# Patient Record
Sex: FEMALE | Race: WHITE | NOT HISPANIC OR LATINO | URBAN - METROPOLITAN AREA
[De-identification: names, ages, dates, MRNs, and addresses within clinical notes are randomized per-mention and may not be internally consistent; named-entity substitution may affect disease eponyms.]

---

## 2017-09-27 ENCOUNTER — OUTPATIENT (OUTPATIENT)
Dept: OUTPATIENT SERVICES | Facility: HOSPITAL | Age: 62
LOS: 1 days | End: 2017-09-27
Payer: COMMERCIAL

## 2017-09-27 ENCOUNTER — APPOINTMENT (OUTPATIENT)
Dept: CT IMAGING | Facility: CLINIC | Age: 62
End: 2017-09-27
Payer: COMMERCIAL

## 2017-09-27 DIAGNOSIS — Z00.8 ENCOUNTER FOR OTHER GENERAL EXAMINATION: ICD-10-CM

## 2017-09-27 PROCEDURE — 70480 CT ORBIT/EAR/FOSSA W/O DYE: CPT

## 2017-09-27 PROCEDURE — 70486 CT MAXILLOFACIAL W/O DYE: CPT | Mod: 26

## 2017-09-27 PROCEDURE — 70480 CT ORBIT/EAR/FOSSA W/O DYE: CPT | Mod: 26

## 2017-09-27 PROCEDURE — 70486 CT MAXILLOFACIAL W/O DYE: CPT

## 2018-11-20 ENCOUNTER — APPOINTMENT (OUTPATIENT)
Dept: ORTHOPEDIC SURGERY | Facility: CLINIC | Age: 63
End: 2018-11-20
Payer: COMMERCIAL

## 2018-11-20 VITALS
HEART RATE: 71 BPM | DIASTOLIC BLOOD PRESSURE: 102 MMHG | HEIGHT: 60 IN | SYSTOLIC BLOOD PRESSURE: 186 MMHG | WEIGHT: 150 LBS | BODY MASS INDEX: 29.45 KG/M2

## 2018-11-20 DIAGNOSIS — Z86.79 PERSONAL HISTORY OF OTHER DISEASES OF THE CIRCULATORY SYSTEM: ICD-10-CM

## 2018-11-20 DIAGNOSIS — F41.1 GENERALIZED ANXIETY DISORDER: ICD-10-CM

## 2018-11-20 DIAGNOSIS — Z87.09 PERSONAL HISTORY OF OTHER DISEASES OF THE RESPIRATORY SYSTEM: ICD-10-CM

## 2018-11-20 DIAGNOSIS — Z87.898 PERSONAL HISTORY OF OTHER SPECIFIED CONDITIONS: ICD-10-CM

## 2018-11-20 PROCEDURE — 99204 OFFICE O/P NEW MOD 45 MIN: CPT

## 2018-11-20 PROCEDURE — 72100 X-RAY EXAM L-S SPINE 2/3 VWS: CPT

## 2018-12-11 ENCOUNTER — APPOINTMENT (OUTPATIENT)
Dept: ORTHOPEDIC SURGERY | Facility: CLINIC | Age: 63
End: 2018-12-11
Payer: COMMERCIAL

## 2018-12-11 VITALS — SYSTOLIC BLOOD PRESSURE: 175 MMHG | DIASTOLIC BLOOD PRESSURE: 102 MMHG | HEART RATE: 71 BPM

## 2018-12-11 PROCEDURE — 99214 OFFICE O/P EST MOD 30 MIN: CPT

## 2018-12-19 ENCOUNTER — RESULT REVIEW (OUTPATIENT)
Age: 63
End: 2018-12-19

## 2019-01-28 ENCOUNTER — APPOINTMENT (OUTPATIENT)
Dept: ORTHOPEDIC SURGERY | Facility: CLINIC | Age: 64
End: 2019-01-28
Payer: COMMERCIAL

## 2019-01-28 VITALS — WEIGHT: 149 LBS | BODY MASS INDEX: 29.25 KG/M2 | HEIGHT: 60 IN

## 2019-01-28 PROCEDURE — 99213 OFFICE O/P EST LOW 20 MIN: CPT

## 2019-01-28 NOTE — HISTORY OF PRESENT ILLNESS
[de-identified] : Her injury occurred in late November the symptoms did not become significantly worsened to late September. Her MRI reveals a bandlike area of edema along the superior endplate of L5 consistent with a compression fracture. Her symptoms have improved on every visit. Currently she has some discomfort, occasional aching and sometimes mild pain. Her bone density reveals a significant decrease. She has been given the name of an osteoporosis specialist. With ongoing symptoms she is not ready to resume active exercise at this point.

## 2019-01-28 NOTE — DISCUSSION/SUMMARY
[de-identified] : She has made arrangements to see osteoporosis vessels. I will see her for followup in 4 weeks.

## 2019-01-28 NOTE — REASON FOR VISIT
[Follow-Up Visit] : a follow-up visit for [Back Pain] : back pain [FreeTextEntry2] : Back pain status post compression fracture of L5

## 2019-01-31 ENCOUNTER — APPOINTMENT (OUTPATIENT)
Dept: ENDOCRINOLOGY | Facility: CLINIC | Age: 64
End: 2019-01-31
Payer: COMMERCIAL

## 2019-01-31 VITALS
HEART RATE: 74 BPM | WEIGHT: 150 LBS | HEIGHT: 60 IN | OXYGEN SATURATION: 98 % | DIASTOLIC BLOOD PRESSURE: 90 MMHG | SYSTOLIC BLOOD PRESSURE: 150 MMHG | BODY MASS INDEX: 29.45 KG/M2

## 2019-01-31 PROCEDURE — 99244 OFF/OP CNSLTJ NEW/EST MOD 40: CPT

## 2019-01-31 RX ORDER — ERGOCALCIFEROL 1.25 MG/1
1.25 MG CAPSULE ORAL
Refills: 0 | Status: ACTIVE | COMMUNITY

## 2019-01-31 RX ORDER — RISEDRONATE SODIUM 150 MG/1
150 TABLET, FILM COATED ORAL
Qty: 3 | Refills: 3 | Status: ACTIVE | COMMUNITY
Start: 2019-01-31 | End: 1900-01-01

## 2019-01-31 RX ORDER — MELOXICAM 7.5 MG/1
7.5 TABLET ORAL
Refills: 0 | Status: COMPLETED | COMMUNITY

## 2019-01-31 RX ORDER — B-12 KIT 1000MCG/ML
KIT INTRAMUSCULAR; SUBCUTANEOUS; TOPICAL
Refills: 0 | Status: ACTIVE | COMMUNITY

## 2019-01-31 RX ORDER — MELOXICAM 15 MG/1
15 TABLET ORAL
Qty: 30 | Refills: 0 | Status: ACTIVE | COMMUNITY
Start: 2019-01-30

## 2019-01-31 RX ORDER — MONTELUKAST 10 MG/1
10 TABLET, FILM COATED ORAL
Refills: 0 | Status: ACTIVE | COMMUNITY

## 2019-01-31 NOTE — CONSULT LETTER
[Consult Letter:] : I had the pleasure of evaluating your patient, [unfilled]. [Please see my note below.] : Please see my note below. [Consult Closing:] : Thank you very much for allowing me to participate in the care of this patient.  If you have any questions, please do not hesitate to contact me. [Sincerely,] : Sincerely, [Dear  ___] : Dear  [unfilled], [DrDevin  ___] : Dr. NARANJO [FreeTextEntry2] : Cale Veloz MD

## 2019-01-31 NOTE — PAST MEDICAL HISTORY
[Menopause Age____] : age at menopause was [unfilled] [History of Hormone Replacement Treatment] : has a history of hormone replacement treatment

## 2019-01-31 NOTE — REVIEW OF SYSTEMS
[Negative] : Heme/Lymph [Dysphonia] : dysphonia [Shortness Of Breath] : shortness of breath [Back Pain] : back pain [FreeTextEntry5] : HTN

## 2019-01-31 NOTE — HISTORY OF PRESENT ILLNESS
[Estrogens (HRT)] : HRT [Calcium (dietary)] : calcium from their regular diet [Vitamin D (supplements)] : Vitamin D as a dietary supplement [Patient taking Meds Correctly] : Patient is taking meds correctly [Taking Steroids] : a history of taking steroids [Current Smoking___(ppd)] : currently smoking [unfilled] ~Upacks per day [Family History of Osteoporosis] : family history of osteoporosis [Regular Dental Follow-Up] : regular dental follow-up [Previous Fragility Fracture] : previous fragility fracture(s) [FreeTextEntry1] : Ms. MELO is a 63 year old female being referred today for osteoporosis. \par \par Pt had compression fx at L5, no specific trauma in Nov 2018. X-rays neg. MRI showed superior endplate fx. Pt had a few chiropractic readjustments. She had BMD Jan 2019 spine:-1.8 fem neck:-2.4 tot hip:-1.2. Images not provided, can not comment on the quality. In 2015 pt had reported osteoporosis in the fem neck:-2.5 though she was never treated. She had a distant ankle and metacarpal fx. FHx of osteoporosis (mother with early menopause and kyphosis). Pt takes Vit D 50,000 1 x/month and B12 shots.  Ca in routine labs normal. \par \par Pt was on HRT for 5 years after menopause then taken off for vasculitis due to an interaction of rx given for H.pylori. Pt was on Prednisone for 6 mons at this time ~2014. She is on Premarin vaginal creme ~2016.\par \par Up to date with DDS, gyn, mammos, and colonoscopy.  [Low Calcium Intake] : no past or present history of low calcium intake [Low Vit D Intake/Exposure] : no past or present history of low vitamin D intake [Kidney Stones] : no history of kidney stones [High Fall Risk] : no fall risk [Frequent Falls] : no frequent falls [Uses a Walker/Cane] : no use of a walker/cane [Family History of Hip Fracture] : no family history of hip fracture [Hyperparathyroidism] : no hyperparathyroidism [History of Radiation Therapy] : no history of radiation therapy [History of Blood Clots] : no history of blood clots

## 2019-01-31 NOTE — PHYSICAL EXAM
[Alert] : alert [No Acute Distress] : no acute distress [Well Nourished] : well nourished [Well Developed] : well developed [Normal Sclera/Conjunctiva] : normal sclera/conjunctiva [EOMI] : extra ocular movement intact [No Proptosis] : no proptosis [Normal Oropharynx] : the oropharynx was normal [Thyroid Not Enlarged] : the thyroid was not enlarged [No Thyroid Nodules] : there were no palpable thyroid nodules [No Respiratory Distress] : no respiratory distress [No Accessory Muscle Use] : no accessory muscle use [Clear to Auscultation] : lungs were clear to auscultation bilaterally [Normal Rate] : heart rate was normal  [Normal S1, S2] : normal S1 and S2 [Regular Rhythm] : with a regular rhythm [Normal Bowel Sounds] : normal bowel sounds [Not Tender] : non-tender [Soft] : abdomen soft [Not Distended] : not distended [Post Cervical Nodes] : posterior cervical nodes [Anterior Cervical Nodes] : anterior cervical nodes [Normal] : normal and non tender [No Spinal Tenderness] : no spinal tenderness [Spine Straight] : spine straight [No Stigmata of Cushings Syndrome] : no stigmata of cushings syndrome [Normal Gait] : normal gait [Normal Strength/Tone] : muscle strength and tone were normal [No Rash] : no rash [Normal Reflexes] : deep tendon reflexes were 2+ and symmetric [No Tremors] : no tremors [Oriented x3] : oriented to person, place, and time [Acanthosis Nigricans] : no acanthosis nigricans

## 2019-01-31 NOTE — ASSESSMENT
[Bisphosphonate Therapy] : Risks  and benefits of bisphosphonate therapy were  discussed with the patient including gastroesophageal irritation, osteonecrosis of the jaw, and atypical femur fractures, and acute phase reaction [Denosumab Therapy] : Risks  and benefits of denosumab therapy were discussed with the patient including eczema, cellulitis, osteonecrosis of the jaw and atypical femur fractures [Bisphosphonates] : The patient was instructed to take bisphosphonates on an empty stomach with a full glass of water,and wait at least 30 minutes before eating or lying down [FreeTextEntry1] : 63 year old female with osteoporosis. \par \par Pt has had low BMD in fem neck since 2015, though she was never previously treated. BMD 2018 essentially stable, though no images provided to review. She had distant ankle and metacarpal fx. Pt was treated with HRT at the time of menopause for 5 years and stopped at the time of complications regarding other rx interactions leading to vasculitis. More recently she had L5 superior end plate fx in Nov 2018. She still has occ back pain. FHx of osteoporosis (mother). She currently smokes and can not get exercise as she used to due to the current back pain. Pt takes Vit D 50,000 monthly. \par \par Recommended no more than calcium 500 mg per day, vitamin D. 2000 units per day, in addition to dietary intake. If pt had persistent back pain due to current fx she could consider Forteo/Tymlos, though this is not really her concern. Recommended initiating medical therapy to stop bone loss, increase BMD, and thus reduce risk of future fx. Standard bone loss prevention medications were reviewed (bisphosphonates). She would benefit from bisphosphonate therapy with the expectation of a drug holiday within 5 years. Risks and benefits of bisphosphonate therapy were discussed to include decreased serum Ca, GI irritation, ONJ, atypical femur fx, and APR. Pt can consider IV Reclast or Prolia if she experiences UGI sx. All questions were answered. Pt understands and agrees to Actonel. Prescription sent out. \par \par f/u in 6 months with repeat BMD in 1 year.  [FreeTextEntry2] : discussed smoking cessation- planning to see  for this in [place].

## 2019-01-31 NOTE — END OF VISIT
[FreeTextEntry3] : I, Na Resi, authored this note working as a medical scribe for Dr. Strange.  01/31/2019. 11:30AM. \par This note was authored by Na Reis working as medical scribe for me. I have reviewed, edited, and revised the note as needed. I am in agreement with the exam findings, imaging findings, and treatment plan.  David Strange MD

## 2019-03-01 ENCOUNTER — APPOINTMENT (OUTPATIENT)
Dept: ORTHOPEDIC SURGERY | Facility: CLINIC | Age: 64
End: 2019-03-01
Payer: COMMERCIAL

## 2019-03-01 DIAGNOSIS — M54.5 LOW BACK PAIN: ICD-10-CM

## 2019-03-01 DIAGNOSIS — M80.00XD AGE-RELATED OSTEOPOROSIS WITH CURRENT PATHOLOGICAL FRACTURE, UNSPECIFIED SITE, SUBSEQUENT ENCOUNTER FOR FRACTURE WITH ROUTINE HEALING: ICD-10-CM

## 2019-03-01 PROCEDURE — 72100 X-RAY EXAM L-S SPINE 2/3 VWS: CPT

## 2019-03-01 PROCEDURE — 99213 OFFICE O/P EST LOW 20 MIN: CPT

## 2019-03-01 NOTE — PHYSICAL EXAM
[de-identified] : AP and lateral x-rays of the lumbar spine show no evidence of any deformity as a result of the compression fracture of L5. The MRI revealed a day and is edema along the superior endplate.

## 2019-03-01 NOTE — HISTORY OF PRESENT ILLNESS
[de-identified] : She is now 4-5 months after a very mild superior end plate compression fracture of L5. She has any osteoporosis specialist and has started on treatment. Her symptoms for the most part currently are only a discomfort.

## 2019-03-01 NOTE — DISCUSSION/SUMMARY
[de-identified] : She consented to gradually increase activities. At this point I would do no more than walking for exercise. I will see her for followup in 6 weeks if she is not making satisfactory progress.

## 2019-05-16 ENCOUNTER — APPOINTMENT (OUTPATIENT)
Dept: ENDOCRINOLOGY | Facility: CLINIC | Age: 64
End: 2019-05-16
Payer: COMMERCIAL

## 2019-05-16 VITALS
HEART RATE: 80 BPM | DIASTOLIC BLOOD PRESSURE: 80 MMHG | SYSTOLIC BLOOD PRESSURE: 122 MMHG | OXYGEN SATURATION: 98 % | WEIGHT: 156 LBS | BODY MASS INDEX: 30.63 KG/M2 | HEIGHT: 60 IN

## 2019-05-16 DIAGNOSIS — M80.80XA OTHER OSTEOPOROSIS WITH CURRENT PATHOLOGICAL FRACTURE, UNSPECIFIED SITE, INITIAL ENCOUNTER FOR FRACTURE: ICD-10-CM

## 2019-05-16 PROCEDURE — 99214 OFFICE O/P EST MOD 30 MIN: CPT

## 2019-05-16 RX ORDER — DIAZEPAM 2 MG/1
2 TABLET ORAL
Refills: 0 | Status: DISCONTINUED | COMMUNITY
End: 2019-05-16

## 2019-05-16 NOTE — HISTORY OF PRESENT ILLNESS
[Estrogens (HRT)] : HRT [Calcium (dietary)] : calcium from their regular diet [Vitamin D (supplements)] : Vitamin D as a dietary supplement [Risedronate (Actonel)] : Risedronate [Patient taking Meds Correctly] : Patient is taking meds correctly [Taking Steroids] : a history of taking steroids [Current Smoking___(ppd)] : currently smoking [unfilled] ~Upacks per day [Family History of Osteoporosis] : family history of osteoporosis [Regular Dental Follow-Up] : regular dental follow-up [Previous Fragility Fracture] : previous fragility fracture(s) [Low Calcium Intake] : no past or present history of low calcium intake [FreeTextEntry1] : 63 year old female with osteoporosis. \par \par Pt had L5 compression fx with no specific trauma in Nov 2018. X-rays neg for fx but MRI showed superior endplate fx. Pt had a few chiropractic readjustments. She had BMD Jan 2019 spine:-1.8 fem neck:-2.4 tot hip:-1.2. Images not provided, can not comment on the quality. In 2015 pt had reported osteoporosis in the fem neck:-2.5 though she was never treated. She had a distant ankle and metacarpal fx. Pt was on HRT for 5 years after menopause then taken off for vasculitis due to an interaction of rx given for H.pylori. Pt was on Prednisone for 6 mons at this time ~2014. She is on Premarin vaginal creme ~2016. Started Actonel Jan 2019. Taking correctly, tolerating well, no UGI sx or thigh pain. Up to date with DDS, gyn, mammos, and colonoscopy. No ONJ. No interval fx. FHx of osteoporosis (mother with early menopause and kyphosis). Pt takes Vit D 50,000 1 x/month and B12 shots.  Ca in routine labs normal. [Kidney Stones] : no history of kidney stones [Low Vit D Intake/Exposure] : no past or present history of low vitamin D intake [High Fall Risk] : no fall risk [Frequent Falls] : no frequent falls [Uses a Walker/Cane] : no use of a walker/cane [Family History of Hip Fracture] : no family history of hip fracture [Hyperparathyroidism] : no hyperparathyroidism [History of Radiation Therapy] : no history of radiation therapy [History of Blood Clots] : no history of blood clots

## 2019-05-16 NOTE — END OF VISIT
[FreeTextEntry3] : I, Na Reis, authored this note working as a medical scribe for Dr. Strange.  05/16/2019.  1:00PM. This note was authored by Na Reis working as medical scribe for me. I have reviewed, edited, and revised the note as needed. I am in agreement with the exam findings, imaging findings, and treatment plan.  David Strange MD

## 2019-05-16 NOTE — ASSESSMENT
[Bisphosphonate Therapy] : Risks  and benefits of bisphosphonate therapy were  discussed with the patient including gastroesophageal irritation, osteonecrosis of the jaw, and atypical femur fractures, and acute phase reaction [Bisphosphonates] : The patient was instructed to take bisphosphonates on an empty stomach with a full glass of water,and wait at least 30 minutes before eating or lying down [FreeTextEntry1] : 63 year old female with osteoporosis. \par \par Pt has had low BMD in fem neck since 2015, though she was never previously treated. BMD 2018 essentially stable, though no images provided to review. She had distant ankle and metacarpal fx. FHx of osteoporosis (mother). Pt was treated with HRT at the time of menopause for 5 years and stopped at the time of complications regarding other rx interactions leading to vasculitis. More recently she had L5 superior end plate fx in Nov 2018 (also has bulging disc here).  Pt started Actonel Jan 2019. Taking correctly, tolerating well, no UGI sx or thigh pain. No ONJ. No interval fx. Still c/o intermittent back pain; not exercising because of this, hurts to drive, can't clean house. She currently smokes. Pt takes Vit D 50,000 monthly.\par \par Pt originally though she was in pain from current fx, she could consider Tymlos/Forteo in attempt to heal the fx quicker. However, AP lateral x-ray reported by ortho as no fx seen. Her pain is more likely coming from the bulging discs, and treatment with the more aggressive osteoporosis rx will not benefit her. Her pain does not come from osteoporosis it is related to arthritis. All questions answered. Pt understands. c/w Actonel. \par \par I request labs sent out today to include marker of bone cell response to rx. \par \par f/u in Jan 2020 for repeat BMD.

## 2019-05-16 NOTE — REVIEW OF SYSTEMS
[Dysphonia] : dysphonia [Shortness Of Breath] : shortness of breath [Back Pain] : back pain [Negative] : Heme/Lymph [Recent Weight Gain (___ Lbs)] : recent [unfilled] ~Ulb weight gain [FreeTextEntry5] : HTN

## 2019-05-22 LAB
25(OH)D3 SERPL-MCNC: 9.5 NG/ML
ALBUMIN SERPL ELPH-MCNC: 4.6 G/DL
ALP BLD-CCNC: 90 U/L
ALT SERPL-CCNC: 25 U/L
ANION GAP SERPL CALC-SCNC: 12 MMOL/L
AST SERPL-CCNC: 19 U/L
BILIRUB SERPL-MCNC: 0.2 MG/DL
BUN SERPL-MCNC: 10 MG/DL
CALCIUM SERPL-MCNC: 9.5 MG/DL
CALCIUM SERPL-MCNC: 9.5 MG/DL
CHLORIDE SERPL-SCNC: 106 MMOL/L
CO2 SERPL-SCNC: 26 MMOL/L
COLLAGEN CTX SERPL-MCNC: 177 PG/ML
CREAT SERPL-MCNC: 0.69 MG/DL
GLUCOSE SERPL-MCNC: 82 MG/DL
PARATHYROID HORMONE INTACT: 71 PG/ML
POTASSIUM SERPL-SCNC: 4.5 MMOL/L
PROT SERPL-MCNC: 7.1 G/DL
SODIUM SERPL-SCNC: 144 MMOL/L

## 2019-06-18 ENCOUNTER — APPOINTMENT (OUTPATIENT)
Dept: ORTHOPEDIC SURGERY | Facility: CLINIC | Age: 64
End: 2019-06-18
Payer: COMMERCIAL

## 2019-06-18 DIAGNOSIS — S32.050A WEDGE COMPRESSION FRACTURE OF FIFTH LUMBAR VERTEBRA, INITIAL ENCOUNTER FOR CLOSED FRACTURE: ICD-10-CM

## 2019-06-18 DIAGNOSIS — M54.5 LOW BACK PAIN: ICD-10-CM

## 2019-06-18 DIAGNOSIS — G89.29 LOW BACK PAIN: ICD-10-CM

## 2019-06-18 PROCEDURE — 72100 X-RAY EXAM L-S SPINE 2/3 VWS: CPT

## 2019-06-18 PROCEDURE — 99214 OFFICE O/P EST MOD 30 MIN: CPT

## 2019-06-18 RX ORDER — IBUPROFEN 800 MG/1
800 TABLET, FILM COATED ORAL
Qty: 90 | Refills: 0 | Status: ACTIVE | COMMUNITY
Start: 2019-06-18 | End: 1900-01-01

## 2019-06-18 NOTE — HISTORY OF PRESENT ILLNESS
[Pain Location] : pain [Worsening] : worsening [de-identified] : Last September she sustained a superior end plate compression fracture of L5. When she was last seen in early March he had only some discomfort in the back but over the last several weeks she has developed some discomfort, ache or mild pain. There is no radiation of the pain into her legs. She has been taking an occasional Advil. [4] : a maximum pain level of 4/10

## 2019-06-18 NOTE — DISCUSSION/SUMMARY
[Medication Risks Reviewed] : Medication risks reviewed [de-identified] : She has been started on ibuprofen 800 mg 3 times a day. She tells me she cannot swallow large pills so she will take 4 Advil 3 times a day after meals. She will use moist heat. I will see her for followup in 4 weeks.

## 2019-06-18 NOTE — PHYSICAL EXAM
[de-identified] : She appears comfortable. Straight leg raising is negative to 90°. [de-identified] : AP and lateral x-rays of the lumbar spine show no evidence of any new fractures.

## 2019-07-18 ENCOUNTER — APPOINTMENT (OUTPATIENT)
Dept: ORTHOPEDIC SURGERY | Facility: CLINIC | Age: 64
End: 2019-07-18

## 2019-08-01 ENCOUNTER — APPOINTMENT (OUTPATIENT)
Dept: ORTHOPEDIC SURGERY | Facility: CLINIC | Age: 64
End: 2019-08-01

## 2020-02-13 ENCOUNTER — APPOINTMENT (OUTPATIENT)
Dept: ENDOCRINOLOGY | Facility: CLINIC | Age: 65
End: 2020-02-13

## 2020-09-11 ENCOUNTER — RESULT REVIEW (OUTPATIENT)
Age: 65
End: 2020-09-11

## 2020-11-23 ENCOUNTER — TRANSCRIPTION ENCOUNTER (OUTPATIENT)
Age: 65
End: 2020-11-23

## 2022-01-04 ENCOUNTER — TRANSCRIPTION ENCOUNTER (OUTPATIENT)
Age: 67
End: 2022-01-04

## 2022-05-20 ENCOUNTER — RESULT REVIEW (OUTPATIENT)
Age: 67
End: 2022-05-20

## 2022-12-01 ENCOUNTER — OFFICE (OUTPATIENT)
Dept: URBAN - METROPOLITAN AREA CLINIC 32 | Facility: CLINIC | Age: 67
Setting detail: OPHTHALMOLOGY
End: 2022-12-01
Payer: MEDICARE

## 2022-12-01 DIAGNOSIS — H53.19: ICD-10-CM

## 2022-12-01 DIAGNOSIS — H33.312: ICD-10-CM

## 2022-12-01 PROCEDURE — 92134 CPTRZ OPH DX IMG PST SGM RTA: CPT | Performed by: OPHTHALMOLOGY

## 2022-12-01 PROCEDURE — 92201 OPSCPY EXTND RTA DRAW UNI/BI: CPT | Performed by: OPHTHALMOLOGY

## 2022-12-01 PROCEDURE — 92012 INTRM OPH EXAM EST PATIENT: CPT | Performed by: OPHTHALMOLOGY

## 2022-12-01 ASSESSMENT — LID EXAM ASSESSMENTS
OD_BLEPHARITIS: RLL RUL 1+
OS_BLEPHARITIS: LLL LUL 1+

## 2022-12-01 ASSESSMENT — VISUAL ACUITY
OS_BCVA: 20/30
OD_BCVA: 20/30-

## 2022-12-01 ASSESSMENT — KERATOMETRY
OD_K2POWER_DIOPTERS: 45.00
OS_K1POWER_DIOPTERS: 45.00
OS_K2POWER_DIOPTERS: 45.50
OD_AXISANGLE_DEGREES: 008
OS_AXISANGLE_DEGREES: 102
OD_K1POWER_DIOPTERS: 44.25

## 2022-12-01 ASSESSMENT — TONOMETRY
OS_IOP_MMHG: 12
OD_IOP_MMHG: 12

## 2022-12-01 ASSESSMENT — REFRACTION_AUTOREFRACTION
OD_AXIS: 085
OD_CYLINDER: -1.50
OS_SPHERE: -0.75
OS_CYLINDER: -0.75
OD_SPHERE: -0.25
OS_AXIS: 095

## 2022-12-01 ASSESSMENT — AXIALLENGTH_DERIVED
OS_AL: 23.3892
OD_AL: 23.5683

## 2022-12-01 ASSESSMENT — CONFRONTATIONAL VISUAL FIELD TEST (CVF)
OD_FINDINGS: FULL
OS_FINDINGS: FULL

## 2022-12-01 ASSESSMENT — SPHEQUIV_DERIVED
OS_SPHEQUIV: -1.125
OD_SPHEQUIV: -1

## 2024-06-04 ENCOUNTER — OFFICE (OUTPATIENT)
Dept: URBAN - METROPOLITAN AREA CLINIC 35 | Facility: CLINIC | Age: 69
Setting detail: OPHTHALMOLOGY
End: 2024-06-04
Payer: MEDICARE

## 2024-06-04 DIAGNOSIS — H00.15: ICD-10-CM

## 2024-06-04 PROCEDURE — 99213 OFFICE O/P EST LOW 20 MIN: CPT | Performed by: OPHTHALMOLOGY

## 2024-06-04 ASSESSMENT — LID EXAM ASSESSMENTS
OD_BLEPHARITIS: RLL RUL 1+
OS_BLEPHARITIS: LLL LUL 1+

## 2024-06-04 ASSESSMENT — CONFRONTATIONAL VISUAL FIELD TEST (CVF)
OS_FINDINGS: FULL
OD_FINDINGS: FULL

## 2024-06-25 NOTE — REASON FOR VISIT
Anticoagulation Progress Note    Indication: Other chronic pulmonary embolism without acute cor pulmonale   Long term current use of anticoagulant   Goal INR: 2.0-3.0  Duration: Long Term  Referring Provider: Dr. Chun  Order Expiration Date: 3/19/25  Supervising Provider: Dr. De La Garza  Pertinent History:  She has been on blood thinners since sustaining multiple pulmonary emboli which she also relates were ana rosa-surgical.  She has a history of a DVT as well and has an IVC filter in place.    Assessment  (-) missed doses:   (-) extra doses:   (-) significant medication changes (RX, OTC, Herbal):  (-) Vitamin K / dietary changes (Vitamin K goal: 3 cups/week):   (-) bleeding / bruising:   (-) alcohol intake:  (-) falls / injury:  (-) acute illness:   (-) procedures / hospitalization / ER visits:     Plan (5mg tablets)  INR Result: 3.1  The INR result for today is supratherapeutic based on the INR goal 2.0-3.0  Etiology: stress?   Recommended Dose: Decrease dose to 2.5 mg Tuesday and 5 mg x 6 days. This is a 7.1% decrease to weekly dose as INR remains elevated.   Follow-Up: 2 weeks   Comments: previous INR was 3.9 on 6/11/24- held dose x 1 day and maintained dose .  AM Doser       Counseling  Stressed importance of compliance with exact recommended dosage regimen  Stressed importance of adherence with recommended lab monitoring  Instructed to notify clinic about medication changes or health status changes  Instructed to notify clinic about scheduled procedures    Provided patient/caregiver with written and verbal dosing instructions, patient/caregiver verbalized understanding.  
[Follow-Up: _____] : a [unfilled] follow-up visit

## 2024-11-04 ENCOUNTER — DOCTOR'S OFFICE (OUTPATIENT)
Facility: LOCATION | Age: 69
Setting detail: OPHTHALMOLOGY
End: 2024-11-04
Payer: MEDICARE

## 2024-11-04 DIAGNOSIS — H43.393: ICD-10-CM

## 2024-11-04 DIAGNOSIS — H33.312: ICD-10-CM

## 2024-11-04 DIAGNOSIS — H53.19: ICD-10-CM

## 2024-11-04 PROCEDURE — 92134 CPTRZ OPH DX IMG PST SGM RTA: CPT | Performed by: OPHTHALMOLOGY

## 2024-11-04 PROCEDURE — 99214 OFFICE O/P EST MOD 30 MIN: CPT | Performed by: OPHTHALMOLOGY

## 2024-11-04 ASSESSMENT — REFRACTION_AUTOREFRACTION
OD_AXIS: 085
OS_SPHERE: -0.75
OD_CYLINDER: -1.50
OS_CYLINDER: -0.75
OS_AXIS: 095
OD_SPHERE: -0.25

## 2024-11-04 ASSESSMENT — KERATOMETRY
OS_K2POWER_DIOPTERS: 45.50
OD_K2POWER_DIOPTERS: 45.00
OD_AXISANGLE_DEGREES: 008
OS_AXISANGLE_DEGREES: 102
OS_K1POWER_DIOPTERS: 45.00
OD_K1POWER_DIOPTERS: 44.25

## 2024-11-04 ASSESSMENT — VISUAL ACUITY
OD_BCVA: 20/20-2
OS_BCVA: 20/25

## 2024-11-04 ASSESSMENT — LID EXAM ASSESSMENTS
OD_BLEPHARITIS: RLL RUL 1+
OS_BLEPHARITIS: LLL LUL 1+

## 2025-05-14 ENCOUNTER — DOCTOR'S OFFICE (OUTPATIENT)
Facility: LOCATION | Age: 70
Setting detail: OPHTHALMOLOGY
End: 2025-05-14
Payer: MEDICARE

## 2025-05-14 DIAGNOSIS — H00.11: ICD-10-CM

## 2025-05-14 PROCEDURE — 92012 INTRM OPH EXAM EST PATIENT: CPT | Performed by: OPHTHALMOLOGY

## 2025-05-14 ASSESSMENT — REFRACTION_AUTOREFRACTION
OD_AXIS: 002
OD_SPHERE: -1.75
OS_SPHERE: -1.25
OS_CYLINDER: +0.50
OD_CYLINDER: +1.00
OS_AXIS: 050

## 2025-05-14 ASSESSMENT — KERATOMETRY
OD_K2POWER_DIOPTERS: 45.25
OS_AXISANGLE_DEGREES: 107
OS_K2POWER_DIOPTERS: 45.50
OD_K1POWER_DIOPTERS: 44.75
OD_AXISANGLE_DEGREES: 058
OS_K1POWER_DIOPTERS: 45.00

## 2025-05-14 ASSESSMENT — VISUAL ACUITY
OD_BCVA: 20/25-1
OS_BCVA: 20/25

## 2025-05-14 ASSESSMENT — LID EXAM ASSESSMENTS
OS_BLEPHARITIS: LLL LUL 1+
OD_BLEPHARITIS: RLL RUL 1+